# Patient Record
Sex: MALE | Race: WHITE
[De-identification: names, ages, dates, MRNs, and addresses within clinical notes are randomized per-mention and may not be internally consistent; named-entity substitution may affect disease eponyms.]

---

## 2022-12-10 ENCOUNTER — HOSPITAL ENCOUNTER (EMERGENCY)
Dept: HOSPITAL 75 - ER FS | Age: 42
Discharge: HOME | End: 2022-12-10
Payer: MEDICAID

## 2022-12-10 VITALS — WEIGHT: 205.47 LBS | BODY MASS INDEX: 30.43 KG/M2 | HEIGHT: 69.02 IN

## 2022-12-10 VITALS — SYSTOLIC BLOOD PRESSURE: 144 MMHG | DIASTOLIC BLOOD PRESSURE: 93 MMHG

## 2022-12-10 DIAGNOSIS — J10.1: Primary | ICD-10-CM

## 2022-12-10 DIAGNOSIS — F17.210: ICD-10-CM

## 2022-12-10 DIAGNOSIS — Z28.310: ICD-10-CM

## 2022-12-10 DIAGNOSIS — Z20.822: ICD-10-CM

## 2022-12-10 PROCEDURE — 87636 SARSCOV2 & INF A&B AMP PRB: CPT

## 2022-12-10 PROCEDURE — 71046 X-RAY EXAM CHEST 2 VIEWS: CPT

## 2022-12-10 NOTE — DIAGNOSTIC IMAGING REPORT
INDICATION:  cough, congestion, COVID PUI.  



TECHNIQUE:  Two view chest   10:15 AM



CORRELATION STUDY:   None



FINDINGS: 

The heart size, mediastinal configuration and pulmonary

vasculature are within normal limits.  

The lungs are clear with no consolidating infiltrate. There is no

significant pleural effusion or pneumothorax.  

Visualized osseous structures are unremarkable.



IMPRESSION: 

1. Negative for acute abnormality of the chest.



Dictated by: 



  Dictated on workstation # [id number]

## 2022-12-10 NOTE — ED COUGH/URI
General


Chief Complaint:  Cough/Cold/Flu Symptoms


Stated Complaint:  CHEST CONGESTION


Source:  patient, spouse


Exam Limitations:  no limitations





History of Present Illness


Date Seen by Provider:  Dec 10, 2022


Time Seen by Provider:  09:44


Initial Comments


43 yo male presenting with 2 weeks of cough, congestion, body aches, plugged 

ears, fever, chills, racing heart at times, wheezing. He had not gone to see 

anyone but was using old medicine that he had at home because he did not want to

have to go to the clinic. He took amoxicillin once a day from left over 

prescription for 6 days without improvement. He was using an inhaler and 

nebulizer to point he ran out of the medicine. He took 1200 mg Ibuprofen with 3 

Excedrin this am for his body aches but still feels bad. While in the shower he 

had blown congestion from his nose. This caused him to get more fluid and 

pressure in his ears and he became dizzy where he felt  he might pass out. His 

wife felt that he was "drowning in congestion" and she reported she was tired of

it and finally talked him in to coming to be seen. He reports he had 101 F fever

this am. Initially he was not able to bring anything up with his cough but in 

last few days that has improved and now he can at least cough up congestion.


Timing/Duration:  week (2 weeks)


Severity/Quality:  severe, productive cough


Prior Episodes/Possible Cause:  no prior episodes


Modifying Factors:  Worse With Activity, Worse With Coughing


Associated Symptoms:  cough, dizziness, earache, fever/chills, headache, 

lightheadedness, muscle aches, nasal congestion, nasal drainage, shortness of 

breath, wheezing





Allergies and Home Medications


Allergies


Coded Allergies:  


     No Known Drug Allergies (Unverified , 12/10/22)





Patient Home Medication List


Home Medication List Reviewed:  Yes


Albuterol Sulfate (Albuterol Sulfate) 2.5 Mg/3 Ml (0.083 %) Vial.neb, 2.5 MG INH

Q6H PRN for WHEEZING


   Prescribed by: RAND WATSON on 12/10/22 1054


Albuterol Sulfate (Proventil Hfa) 6.7 Gm Hfa.aer.ad, 2 PUFF INH Q6H PRN for 

SHORTNESS OF BREATH


   Prescribed by: RAND WATSON on 12/10/22 1054


Oseltamivir Phosphate (Oseltamivir Phosphate) 75 Mg Capsule, 75 MG PO BID


   Prescribed by: RAND WATSON on 12/10/22 1054





Review of Systems


Review of Systems


Constitutional:  see HPI


EENTM:  see HPI


Respiratory:  see HPI


Cardiovascular:  see HPI


Gastrointestinal:  loss of appetite


Genitourinary:  no symptoms reported


Musculoskeletal:  see HPI


Skin:  No rash


Psychiatric/Neurological:  Headache





Past Medical-Social-Family Hx


Patient Social History


Tobacco Use?:  Yes


Tobacco type used:  Cigarettes


Smoking Status:  Current Everyday Smoker (no cigarettes in last few days due to 

his cough)


Substance use?:  No


Alcohol Use?:  No


Pt feels they are or have been:  No





Physical Exam





Vital Signs - First Documented




















Capillary Refill :


Height: '"


Weight: lbs. oz. kg;  BMI


Method:


General Appearance:  WD/WN, no apparent distress


HEENT:  PERRL/EOMI, TMs normal (both TMs are clear but he has some mild effusion

bilaterally); No photophobia; pharyngeal erythema; No tonsillar exudate


Neck:  non-tender, full range of motion, supple, normal inspection


Respiratory:  chest non-tender, lungs clear, normal breath sounds, no 

respiratory distress, no accessory muscle use


Cardiovascular:  normal peripheral pulses, regular rate, rhythm


Gastrointestinal:  normal bowel sounds, non tender, soft, no pulsatile mass


Extremities:  normal range of motion, normal capillary refill


Neurologic/Psychiatric:  alert, oriented x 3


Skin:  normal color, warm/dry; No rash





Progress/Results/Core Measures


Suspected Sepsis


SIRS


Temperature: 


Pulse:  


Respiratory Rate: 


 


Blood Pressure  / 


Mean:





Results/Orders


Lab Results





Laboratory Tests








Test


 12/10/22


09:45 Range/Units


 


 


Influenza Type A (RT-PCR) Detected H Not Detecte  


 


Influenza Type B (RT-PCR) Not Detected  Not Detecte  


 


SARS-CoV-2 RNA (RT-PCR) Not Detected  Not Detecte  








My Orders





Orders - RAND WATSON MD


Covid 19 Inhouse Test (12/10/22 09:54)


Chest Pa/Lat (2 View) (12/10/22 09:54)


Influenza A And B By Pcr (12/10/22 09:54)


Isolation Central Supply Req (12/10/22 09:54)





Vital Signs/I&O











 12/10/22 12/10/22





 09:51 09:51


 


Temp 36.5 


 


Pulse 88 


 


Resp 18 


 


B/P (MAP) 144/93 (110) 


 


Pulse Ox 99 


 


O2 Delivery Room Air Room Air





Capillary Refill :


Progress Note #1:  


Progress Note


Chest xray since he has had cough for 2 weeks and is a smoker so he is at risk 

of atypical infections. He was counseled to take medicine as prescribed and if 

going to take antibiotics only do that with prescription and at appropriate 

dosing.Swab sent to check for influenza and Covid.


Progress Note #2:  


Progress Note


Covid negative but positive for Influenza A. Chest xray does not show any acute 

infiltrate or effusion. Since he reports having fever this am will discuss 

option of Tamiflu, even though he has had symptoms for more than 48 hours so it 

may not do much to help him.  Encourage fluids and rest and robitussin or 

mucinex to help with congestion. Follow up with primary care for continued 

concerns. Refill albuterol since he used up what he had. Counseled on 

symptomatic treatment.


Progress Note #3:  


   Time:  11:22


Progress Note


Pharmacy called to say that they do not have Tamiflu in stock but do have 

Xofluza and his insurance would cover that alternative. I gave them the ok to 

use Xofluza 80 mg po x 1.





Diagnostic Imaging





   Diagonstic Imaging:  Xray


   Plain Films/CT/US/NM/MRI:  chest


Comments


                 [facility].


                                [place], [place]





NAME:   LOUIE FREITAS


MED REC#:   [id number]


ACCOUNT#:   [id number]


PT STATUS:   REG ER


:   1980


PHYSICIAN:   RAND WATSON MD


ADMIT DATE:   12/10/22/ER FS


                                  ***Signed***


Date of Exam:12/10/22





CHEST PA/LAT (2 VIEW)








INDICATION:  cough, congestion, COVID PUI.  





TECHNIQUE:  Two view chest   10:15 AM





CORRELATION STUDY:   None





FINDINGS: 


The heart size, mediastinal configuration and pulmonary


vasculature are within normal limits.  


The lungs are clear with no consolidating infiltrate. There is no


significant pleural effusion or pneumothorax.  


Visualized osseous structures are unremarkable.





IMPRESSION: 


1. Negative for acute abnormality of the chest.





Dictated by: 





  Dictated on workstation # [id number]








Dict:   12/10/22 1015


Trans:   12/10/22 1022


DO [id number]





Interpreted by:     GEETHA BINGHAM DO


Electronically signed by: GEETHA BINGHAM DO 12/10/22 1022


   Reviewed:  Reviewed by Me





Departure


Impression





   Primary Impression:  


   Influenza A


   Additional Impressions:  


   Upper respiratory infection


   Qualified Codes:  J06.9 - Acute upper respiratory infection, unspecified


   Generalized body aches


Disposition:  01 HOME, SELF-CARE


Condition:  Stable





Departure-Patient Inst.


Decision time for Depature:  10:49


Referrals:  


MARQUITA OSHEA (PCP)


Primary Care Physician








[facility] (Family)


Primary Care Physician


Patient Instructions:  Flu, Adult ED, Upper Respiratory Infection ED





Add. Discharge Instructions:  


You should wear a mask around others while you are having fevers and coughing. 





Continue to drink plenty of water and electrolyte drinks to help with hydration 

and body aches. 





Try Robitussin or Mucinex to help with loosening your cough and congestion.





All discharge instructions reviewed with patient and/or family. Voiced 

understanding.


Scripts


Albuterol Sulfate (Proventil Hfa) 6.7 Gm Hfa.aer.ad


2 PUFF INH Q6H PRN for SHORTNESS OF BREATH for 30 Days, #6.7 GM 1 Refill


   Prov: RAND WATSON MD         12/10/22 


Albuterol Sulfate (Albuterol Sulfate) 2.5 Mg/3 Ml (0.083 %) Vial.neb


2.5 MG INH Q6H PRN for WHEEZING for 7 Days, #75 ML 1 Refill


   Prov: RAND WATSON MD         12/10/22 


Oseltamivir Phosphate (Oseltamivir Phosphate) 75 Mg Capsule


75 MG PO BID for Influenza A for 5 Days, #10 CAP 0 Refills


   Prov: RAND WATSON MD         12/10/22











RAND WATSON MD               Dec 10, 2022 10:06